# Patient Record
Sex: MALE | Race: WHITE | NOT HISPANIC OR LATINO | Employment: FULL TIME | URBAN - METROPOLITAN AREA
[De-identification: names, ages, dates, MRNs, and addresses within clinical notes are randomized per-mention and may not be internally consistent; named-entity substitution may affect disease eponyms.]

---

## 2017-01-24 ENCOUNTER — CLINICAL SUPPORT (OUTPATIENT)
Dept: FAMILY MEDICINE CLINIC | Facility: CLINIC | Age: 52
End: 2017-01-24

## 2017-01-24 DIAGNOSIS — Z23 NEED FOR VACCINATION FOR DTP: Primary | ICD-10-CM

## 2017-01-24 PROCEDURE — 90471 IMMUNIZATION ADMIN: CPT | Performed by: INTERNAL MEDICINE

## 2017-01-24 PROCEDURE — 90715 TDAP VACCINE 7 YRS/> IM: CPT | Performed by: INTERNAL MEDICINE

## 2017-01-24 NOTE — MR AVS SNAPSHOT
Luke Li   2017 8:00 AM   Appointment    Dept Phone:  [phone]   Encounter #:  [id number]    Provider:  NURSE/GARRY CORONADO [employer]   Department:  [facility] PRIMARY CARE                Your Full Care Plan              Your Updated Medication List          This list is accurate as of: 17  8:04 AM.  Always use your most recent med list.                azithromycin 250 MG tablet   Commonly known as:  ZITHROMAX Z-REGIS   Take 2 tablets the first day, then 1 tablet daily for 4 days.       HYOSCYAMINE SULFATE PO       labetalol 100 MG tablet   Commonly known as:  NORMODYNE   Take 1 tablet by mouth 2 (two) times a day.       losartan-hydrochlorothiazide 100-25 MG per tablet   Commonly known as:  HYZAAR   Take 1 tablet by mouth daily.       simvastatin 20 MG tablet   Commonly known as:  ZOCOR   Take 1 tablet by mouth every night.               Instructions     None    Patient Instructions History      Upcoming Appointments     Visit Type Date Time Department    NURSE/MA VISIT 2017  8:00 AM dermSearch Signup     [facility] [employer] allows you to send messages to your doctor, view your test results, renew your prescriptions, schedule appointments, and more. To sign up, go to [employer] and click on the Sign Up Now link in the New User? box. Enter your [employer] Activation Code exactly as it appears below along with the last four digits of your Social Security Number and your Date of Birth () to complete the sign-up process. If you do not sign up before the expiration date, you must request a new code.    [employer] Activation Code: [email or web address]  Expires: 2017  8:04 AM    If you have questions, you can email [email or web address] or call [phone] to talk to our [employer] staff. Remember, [employer] is NOT to be used for urgent needs. For medical emergencies, dial 911.               Other Info from Your Visit              Allergies     Penicillins        Vital Signs     Smoking Status                   Never Smoker

## 2017-02-01 RX ORDER — LABETALOL 100 MG/1
TABLET, FILM COATED ORAL
Qty: 180 TABLET | Refills: 0 | Status: SHIPPED | OUTPATIENT
Start: 2017-02-01 | End: 2017-05-05 | Stop reason: SDUPTHER

## 2017-02-01 RX ORDER — SIMVASTATIN 20 MG
TABLET ORAL
Qty: 90 TABLET | Refills: 0 | Status: SHIPPED | OUTPATIENT
Start: 2017-02-01 | End: 2017-05-05 | Stop reason: SDUPTHER

## 2017-02-01 RX ORDER — LOSARTAN POTASSIUM AND HYDROCHLOROTHIAZIDE 25; 100 MG/1; MG/1
TABLET ORAL
Qty: 90 TABLET | Refills: 0 | Status: SHIPPED | OUTPATIENT
Start: 2017-02-01 | End: 2017-05-05 | Stop reason: SDUPTHER

## 2017-05-05 RX ORDER — SIMVASTATIN 20 MG
TABLET ORAL
Qty: 30 TABLET | Refills: 0 | Status: SHIPPED | OUTPATIENT
Start: 2017-05-05 | End: 2017-06-05 | Stop reason: SDUPTHER

## 2017-05-05 RX ORDER — LABETALOL 100 MG/1
TABLET, FILM COATED ORAL
Qty: 60 TABLET | Refills: 0 | Status: SHIPPED | OUTPATIENT
Start: 2017-05-05 | End: 2017-06-05 | Stop reason: SDUPTHER

## 2017-05-05 RX ORDER — LOSARTAN POTASSIUM AND HYDROCHLOROTHIAZIDE 25; 100 MG/1; MG/1
TABLET ORAL
Qty: 30 TABLET | Refills: 0 | Status: SHIPPED | OUTPATIENT
Start: 2017-05-05 | End: 2017-06-05 | Stop reason: SDUPTHER

## 2017-06-05 RX ORDER — LABETALOL 100 MG/1
TABLET, FILM COATED ORAL
Qty: 60 TABLET | Refills: 0 | Status: SHIPPED | OUTPATIENT
Start: 2017-06-05 | End: 2017-07-06 | Stop reason: SDUPTHER

## 2017-06-05 RX ORDER — LOSARTAN POTASSIUM AND HYDROCHLOROTHIAZIDE 25; 100 MG/1; MG/1
TABLET ORAL
Qty: 30 TABLET | Refills: 0 | Status: SHIPPED | OUTPATIENT
Start: 2017-06-05 | End: 2017-07-06 | Stop reason: SDUPTHER

## 2017-06-05 RX ORDER — SIMVASTATIN 20 MG
TABLET ORAL
Qty: 30 TABLET | Refills: 0 | Status: SHIPPED | OUTPATIENT
Start: 2017-06-05 | End: 2017-11-20

## 2017-07-06 RX ORDER — SIMVASTATIN 20 MG
TABLET ORAL
Qty: 30 TABLET | Refills: 2 | Status: SHIPPED | OUTPATIENT
Start: 2017-07-06 | End: 2017-10-04 | Stop reason: SDUPTHER

## 2017-07-06 RX ORDER — LOSARTAN POTASSIUM AND HYDROCHLOROTHIAZIDE 25; 100 MG/1; MG/1
TABLET ORAL
Qty: 30 TABLET | Refills: 2 | Status: SHIPPED | OUTPATIENT
Start: 2017-07-06 | End: 2017-10-04 | Stop reason: SDUPTHER

## 2017-07-06 RX ORDER — LABETALOL 100 MG/1
TABLET, FILM COATED ORAL
Qty: 60 TABLET | Refills: 2 | Status: SHIPPED | OUTPATIENT
Start: 2017-07-06 | End: 2017-10-04 | Stop reason: SDUPTHER

## 2017-08-08 RX ORDER — HYOSCYAMINE SULFATE 0.125 MG
TABLET ORAL
Qty: 120 TABLET | Refills: 1 | Status: SHIPPED | OUTPATIENT
Start: 2017-08-08 | End: 2018-04-13 | Stop reason: ALTCHOICE

## 2017-10-04 RX ORDER — SIMVASTATIN 20 MG
TABLET ORAL
Qty: 30 TABLET | Refills: 1 | Status: SHIPPED | OUTPATIENT
Start: 2017-10-04 | End: 2017-12-06 | Stop reason: SDUPTHER

## 2017-10-04 RX ORDER — LOSARTAN POTASSIUM AND HYDROCHLOROTHIAZIDE 25; 100 MG/1; MG/1
TABLET ORAL
Qty: 30 TABLET | Refills: 1 | Status: SHIPPED | OUTPATIENT
Start: 2017-10-04 | End: 2017-12-06 | Stop reason: SDUPTHER

## 2017-10-04 RX ORDER — LABETALOL 100 MG/1
TABLET, FILM COATED ORAL
Qty: 60 TABLET | Refills: 1 | Status: SHIPPED | OUTPATIENT
Start: 2017-10-04 | End: 2017-12-06 | Stop reason: SDUPTHER

## 2017-11-20 ENCOUNTER — OFFICE VISIT (OUTPATIENT)
Dept: FAMILY MEDICINE CLINIC | Facility: CLINIC | Age: 52
End: 2017-11-20

## 2017-11-20 VITALS
BODY MASS INDEX: 39.82 KG/M2 | HEART RATE: 69 BPM | RESPIRATION RATE: 16 BRPM | HEIGHT: 72 IN | DIASTOLIC BLOOD PRESSURE: 80 MMHG | OXYGEN SATURATION: 96 % | SYSTOLIC BLOOD PRESSURE: 134 MMHG | WEIGHT: 294 LBS | TEMPERATURE: 97.8 F

## 2017-11-20 DIAGNOSIS — Z12.5 PROSTATE CANCER SCREENING: ICD-10-CM

## 2017-11-20 DIAGNOSIS — Z00.00 HEALTH CARE MAINTENANCE: Primary | ICD-10-CM

## 2017-11-20 DIAGNOSIS — I10 ESSENTIAL HYPERTENSION: ICD-10-CM

## 2017-11-20 DIAGNOSIS — E78.00 PURE HYPERCHOLESTEROLEMIA: ICD-10-CM

## 2017-11-20 DIAGNOSIS — Z12.11 COLON CANCER SCREENING: ICD-10-CM

## 2017-11-20 LAB
ALBUMIN SERPL-MCNC: 4.6 G/DL (ref 3.5–5.2)
ALBUMIN/GLOB SERPL: 1.6 G/DL
ALP SERPL-CCNC: 62 U/L (ref 39–117)
ALT SERPL-CCNC: 25 U/L (ref 1–41)
AST SERPL-CCNC: 21 U/L (ref 1–40)
BASOPHILS # BLD AUTO: 0.02 10*3/MM3 (ref 0–0.2)
BASOPHILS NFR BLD AUTO: 0.2 % (ref 0–1.5)
BILIRUB SERPL-MCNC: 0.4 MG/DL (ref 0.1–1.2)
BUN SERPL-MCNC: 15 MG/DL (ref 6–20)
BUN/CREAT SERPL: 15.6 (ref 7–25)
CALCIUM SERPL-MCNC: 9.5 MG/DL (ref 8.6–10.5)
CHLORIDE SERPL-SCNC: 102 MMOL/L (ref 98–107)
CHOLEST SERPL-MCNC: 167 MG/DL (ref 0–200)
CO2 SERPL-SCNC: 27.1 MMOL/L (ref 22–29)
CREAT SERPL-MCNC: 0.96 MG/DL (ref 0.76–1.27)
EOSINOPHIL # BLD AUTO: 0.3 10*3/MM3 (ref 0–0.7)
EOSINOPHIL NFR BLD AUTO: 3.3 % (ref 0.3–6.2)
ERYTHROCYTE [DISTWIDTH] IN BLOOD BY AUTOMATED COUNT: 13.3 % (ref 11.5–14.5)
GFR SERPLBLD CREATININE-BSD FMLA CKD-EPI: 100 ML/MIN/1.73
GFR SERPLBLD CREATININE-BSD FMLA CKD-EPI: 83 ML/MIN/1.73
GLOBULIN SER CALC-MCNC: 2.8 GM/DL
GLUCOSE SERPL-MCNC: 98 MG/DL (ref 65–99)
HCT VFR BLD AUTO: 42.8 % (ref 40.4–52.2)
HDLC SERPL-MCNC: 40 MG/DL (ref 40–60)
HGB BLD-MCNC: 14 G/DL (ref 13.7–17.6)
IMM GRANULOCYTES # BLD: 0.04 10*3/MM3 (ref 0–0.03)
IMM GRANULOCYTES NFR BLD: 0.4 % (ref 0–0.5)
LDLC SERPL CALC-MCNC: 98 MG/DL (ref 0–100)
LYMPHOCYTES # BLD AUTO: 2.05 10*3/MM3 (ref 0.9–4.8)
LYMPHOCYTES NFR BLD AUTO: 22.7 % (ref 19.6–45.3)
MCH RBC QN AUTO: 29.8 PG (ref 27–32.7)
MCHC RBC AUTO-ENTMCNC: 32.7 G/DL (ref 32.6–36.4)
MCV RBC AUTO: 91.1 FL (ref 79.8–96.2)
MONOCYTES # BLD AUTO: 0.43 10*3/MM3 (ref 0.2–1.2)
MONOCYTES NFR BLD AUTO: 4.8 % (ref 5–12)
NEUTROPHILS # BLD AUTO: 6.2 10*3/MM3 (ref 1.9–8.1)
NEUTROPHILS NFR BLD AUTO: 68.6 % (ref 42.7–76)
PLATELET # BLD AUTO: 201 10*3/MM3 (ref 140–500)
POTASSIUM SERPL-SCNC: 3.8 MMOL/L (ref 3.5–5.2)
PROT SERPL-MCNC: 7.4 G/DL (ref 6–8.5)
PSA SERPL-MCNC: 0.28 NG/ML (ref 0–4)
RBC # BLD AUTO: 4.7 10*6/MM3 (ref 4.6–6)
SODIUM SERPL-SCNC: 143 MMOL/L (ref 136–145)
T4 FREE SERPL-MCNC: 0.97 NG/DL (ref 0.93–1.7)
TRIGL SERPL-MCNC: 144 MG/DL (ref 0–150)
TSH SERPL DL<=0.005 MIU/L-ACNC: 1.23 MIU/ML (ref 0.27–4.2)
VLDLC SERPL CALC-MCNC: 28.8 MG/DL (ref 5–40)
WBC # BLD AUTO: 9.04 10*3/MM3 (ref 4.5–10.7)

## 2017-11-20 PROCEDURE — 99396 PREV VISIT EST AGE 40-64: CPT | Performed by: INTERNAL MEDICINE

## 2017-11-20 NOTE — PROGRESS NOTES
"Subjective   Patient ID: Luke Li is a 52 y.o. male presents with   Chief Complaint   Patient presents with   • Annual Exam       HPI - This patient is here today for yearly physical exam and treatment for hypertension and hyperlipidemia.  He's also ready to do his colonoscopy.  He wishes to see Dr. Yarbrough.  Overall patient feels well.    Assessment plan    Health care maintenance-screening colonoscopy-we'll get a PSA for prostate cancer screening.    Essential hypertension labetalol and Hyzaar    Hyperlipidemia simvastatin 20 get a fasting lipid profile-    Healthcare maintenance-refuses influenza vaccine.    Allergies   Allergen Reactions   • Penicillins        The following portions of the patient's history were reviewed and updated as appropriate: allergies, current medications, past family history, past medical history, past social history, past surgical history and problem list.      Review of Systems   Constitutional: Negative.    HENT: Negative.    Eyes: Negative.    Respiratory: Negative.    Cardiovascular: Negative.    Gastrointestinal: Negative.    Endocrine: Negative.    Genitourinary: Negative.    Musculoskeletal: Negative.    Skin: Negative.    Allergic/Immunologic: Negative.    Neurological: Negative.    Hematological: Negative.    Psychiatric/Behavioral: Negative.        Objective     Vitals:    11/20/17 0904   BP: 134/80   Pulse: 69   Resp: 16   Temp: 97.8 °F (36.6 °C)   TempSrc: Oral   SpO2: 96%   Weight: 294 lb (133 kg)   Height: 72\" (182.9 cm)         Physical Exam   Constitutional: He is oriented to person, place, and time. He appears well-developed and well-nourished. No distress.   HENT:   Head: Normocephalic and atraumatic.   Eyes: Conjunctivae and EOM are normal. Pupils are equal, round, and reactive to light. Right eye exhibits no discharge. Left eye exhibits no discharge. No scleral icterus.   Neck: Normal range of motion. Neck supple. No tracheal deviation present. No thyromegaly " present.   Cardiovascular: Normal rate, regular rhythm, normal heart sounds and normal pulses.  Exam reveals no gallop.    No murmur heard.  Pulmonary/Chest: Effort normal and breath sounds normal. No respiratory distress. He has no wheezes. He has no rales.   Musculoskeletal: Normal range of motion.   Neurological: He is alert and oriented to person, place, and time. He exhibits normal muscle tone. Coordination normal.   Skin: Skin is warm. No rash noted. No erythema. No pallor.   Psychiatric: He has a normal mood and affect. His behavior is normal. Judgment and thought content normal.   Nursing note and vitals reviewed.        Luke was seen today for annual exam.    Diagnoses and all orders for this visit:    Health care maintenance  -     Lipid Panel  -     CBC & Differential  -     Comprehensive Metabolic Panel  -     TSH+Free T4  -     PSA    Essential hypertension  -     Lipid Panel  -     CBC & Differential  -     Comprehensive Metabolic Panel  -     TSH+Free T4  -     PSA    Pure hypercholesterolemia  -     Lipid Panel  -     CBC & Differential  -     Comprehensive Metabolic Panel  -     TSH+Free T4  -     PSA    Prostate cancer screening  -     Lipid Panel  -     CBC & Differential  -     Comprehensive Metabolic Panel  -     TSH+Free T4  -     PSA    Colon cancer screening  -     Ambulatory Referral to General Surgery        Call or return to clinic prn if these symptoms worsen or fail to improve as anticipated.

## 2017-12-06 RX ORDER — LABETALOL 100 MG/1
TABLET, FILM COATED ORAL
Qty: 60 TABLET | Refills: 5 | Status: SHIPPED | OUTPATIENT
Start: 2017-12-06 | End: 2018-06-06 | Stop reason: SDUPTHER

## 2017-12-06 RX ORDER — SIMVASTATIN 20 MG
TABLET ORAL
Qty: 30 TABLET | Refills: 5 | Status: SHIPPED | OUTPATIENT
Start: 2017-12-06 | End: 2018-06-06 | Stop reason: SDUPTHER

## 2017-12-06 RX ORDER — LOSARTAN POTASSIUM AND HYDROCHLOROTHIAZIDE 25; 100 MG/1; MG/1
TABLET ORAL
Qty: 30 TABLET | Refills: 5 | Status: SHIPPED | OUTPATIENT
Start: 2017-12-06 | End: 2018-06-06 | Stop reason: SDUPTHER

## 2018-02-15 ENCOUNTER — TELEPHONE (OUTPATIENT)
Dept: FAMILY MEDICINE CLINIC | Facility: CLINIC | Age: 53
End: 2018-02-15

## 2018-02-15 NOTE — TELEPHONE ENCOUNTER
Received on call, patient with chest pain and increased heart rate after walking for extended period. Called patient, discussed need for ER due to correlation with chest pain with blood pressure elevated after exertion. Spouse says they do not want to go to the ER due to flu. Discussed the importance of going to ER now due to increased risk of a heart attack. Patient agreeable.

## 2018-02-23 ENCOUNTER — OFFICE VISIT (OUTPATIENT)
Dept: FAMILY MEDICINE CLINIC | Facility: CLINIC | Age: 53
End: 2018-02-23

## 2018-02-23 VITALS
WEIGHT: 289 LBS | HEART RATE: 64 BPM | HEIGHT: 72 IN | DIASTOLIC BLOOD PRESSURE: 82 MMHG | BODY MASS INDEX: 39.14 KG/M2 | TEMPERATURE: 98 F | OXYGEN SATURATION: 96 % | RESPIRATION RATE: 16 BRPM | SYSTOLIC BLOOD PRESSURE: 132 MMHG

## 2018-02-23 DIAGNOSIS — R07.9 CHEST PAIN, UNSPECIFIED TYPE: Primary | ICD-10-CM

## 2018-02-23 DIAGNOSIS — I10 ESSENTIAL HYPERTENSION: ICD-10-CM

## 2018-02-23 PROCEDURE — 99214 OFFICE O/P EST MOD 30 MIN: CPT | Performed by: INTERNAL MEDICINE

## 2018-02-23 RX ORDER — AMLODIPINE BESYLATE 5 MG/1
5 TABLET ORAL DAILY
Qty: 90 TABLET | Refills: 1 | Status: SHIPPED | OUTPATIENT
Start: 2018-02-23 | End: 2018-03-13 | Stop reason: SDUPTHER

## 2018-02-23 NOTE — PROGRESS NOTES
"Subjective   Patient ID: Luke Li is a 52 y.o. male presents with   Chief Complaint   Patient presents with   • Follow-up     [facility] gustafson for bp        HPI - This patient presents today for hypertension.  And atypical chest pain.  He went to the emergency room with some atypical chest pain and elevated blood pressure.  They did an electrocardiogram and did cardiac enzymes that were negative.  His symptoms are vague mild and nonexertional.  He does have a family history of coronary disease.  Today he is chest pain-free.  Blood pressure log indicates slightly elevated blood pressure.  I reviewed his testing from [facility]    Assessment plan    Atypical chest pain and uncontrolled essential hypertension continue current anti-hypertensives and add amlodipine 5 mg daily    Allergies   Allergen Reactions   • Penicillins        The following portions of the patient's history were reviewed and updated as appropriate: allergies, current medications, past family history, past medical history, past social history, past surgical history and problem list.      Review of Systems   Constitutional: Negative.    HENT: Negative.    Respiratory: Negative.  Negative for shortness of breath.    Cardiovascular: Positive for chest pain. Negative for leg swelling.   Musculoskeletal: Negative.    Psychiatric/Behavioral: Negative.        Objective     Vitals:    02/23/18 1002   BP: 132/82   Pulse: 64   Resp: 16   Temp: 98 °F (36.7 °C)   TempSrc: Oral   SpO2: 96%   Weight: 131 kg (289 lb)   Height: 182.9 cm (72\")         Physical Exam   Constitutional: He is oriented to person, place, and time. He appears well-developed and well-nourished.   HENT:   Head: Normocephalic and atraumatic.   Eyes: EOM are normal. Pupils are equal, round, and reactive to light.   Cardiovascular: Normal rate and regular rhythm.    Pulmonary/Chest: Effort normal and breath sounds normal.   Neurological: He is alert and oriented to person, place, and time. "   Psychiatric: He has a normal mood and affect. His behavior is normal.   Nursing note and vitals reviewed.        Luke was seen today for follow-up.    Diagnoses and all orders for this visit:    Chest pain, unspecified type    Essential hypertension  -     Ambulatory Referral to Cardiology    Other orders  -     amLODIPine (NORVASC) 5 MG tablet; Take 1 tablet by mouth Daily.        Call or return to clinic prn if these symptoms worsen or fail to improve as anticipated.

## 2018-03-12 ENCOUNTER — TELEPHONE (OUTPATIENT)
Dept: FAMILY MEDICINE CLINIC | Facility: CLINIC | Age: 53
End: 2018-03-12

## 2018-03-12 NOTE — TELEPHONE ENCOUNTER
Pt called about his bp average    2/23    3 pm 144/86      Hs   153/87  2/24     Am 152/89       Hs 152/83  2/25     Am 138/93       Hs 148/85  2/26    am 155/88         Hs 143/88  2/27    am 155/85       Hs    136/83  2/28 4 pm 151/87    Hs 164/93  3/1     am 144/95        Hs 155/92  3/2     am 166/102      Hs 133/112  3/3    am 129/76       Hs 138/90  3/4    am 155/89        hs 136/88  3/5    am 140/90        hs 131/95    3/10 am 152/92       Hs 156/88  3/11 am 128/90       hs 108/74  3/12 am 138/87        Taking losartan. labetaolol , amlodipine         Pt said this can wait for dr pablo to see what he wants to do with his bp meds. thanks

## 2018-03-13 RX ORDER — AMLODIPINE BESYLATE 5 MG/1
5 TABLET ORAL 2 TIMES DAILY
Qty: 180 TABLET | Refills: 0 | Status: SHIPPED | OUTPATIENT
Start: 2018-03-13 | End: 2018-07-16 | Stop reason: SDUPTHER

## 2018-03-13 NOTE — TELEPHONE ENCOUNTER
Blood pressures elevated  Increase amlodipine 5 mg twice daily  Follow-up with Dr. Randhawa in couple weeks    Sooner problems  Please give him new Rx amlodipine 5 mg  Dispense 60  +2 refills  One by mouth twice a day    Thank you

## 2018-03-22 ENCOUNTER — TELEPHONE (OUTPATIENT)
Dept: FAMILY MEDICINE CLINIC | Facility: CLINIC | Age: 53
End: 2018-03-22

## 2018-03-22 NOTE — TELEPHONE ENCOUNTER
We did increase meds last weeks and his numbers on bp are still not great here they are for the past week    3/17/18 am 137/91     pm ---  3/18/18 am 164/106   pm 133/81  3/19/18 am 163/93     Pm 149/97  3/20/18 am 159/94     Pm 145/93  3/21/18 am 157/97     Pm 149/91  3/22/18 am 139/92    Please advise thanks

## 2018-03-23 NOTE — TELEPHONE ENCOUNTER
Patient should be on Hyzaar and amlodipine 5 mg twice daily have him take all 10 mg of amlodipine before bed.  Patient needs to lose about 10 pounds and I believe that way up the blood pressure to

## 2018-04-13 ENCOUNTER — OFFICE VISIT (OUTPATIENT)
Dept: CARDIOLOGY | Facility: CLINIC | Age: 53
End: 2018-04-13

## 2018-04-13 VITALS
BODY MASS INDEX: 39.14 KG/M2 | DIASTOLIC BLOOD PRESSURE: 94 MMHG | HEART RATE: 60 BPM | SYSTOLIC BLOOD PRESSURE: 154 MMHG | HEIGHT: 72 IN | WEIGHT: 289 LBS

## 2018-04-13 DIAGNOSIS — I10 ESSENTIAL HYPERTENSION: Primary | ICD-10-CM

## 2018-04-13 PROCEDURE — 93000 ELECTROCARDIOGRAM COMPLETE: CPT | Performed by: INTERNAL MEDICINE

## 2018-04-13 PROCEDURE — 99203 OFFICE O/P NEW LOW 30 MIN: CPT | Performed by: INTERNAL MEDICINE

## 2018-04-13 RX ORDER — CETIRIZINE HYDROCHLORIDE 10 MG/1
10 TABLET ORAL DAILY
COMMUNITY

## 2018-04-13 NOTE — PROGRESS NOTES
Subjective:     Encounter Date:04/13/2018      Patient ID: Luke Li is a 52 y.o. male.    Chief Complaint:  Hypertension   This is a new problem. The current episode started 1 to 4 weeks ago. The problem has been waxing and waning since onset. The problem is resistant. Pertinent negatives include no chest pain, peripheral edema or shortness of breath.       52-year-old gentleman who was referred by Dr. Randhawa for consultation of hypertension.  Patient presented with some chest discomforts.  He described it as a pressure sensation.  During the time of pressure in his chest his blood pressure was 193/101.  He was initiated on antihypertensive therapy.  Ice see his wife is a patient and he subsequently came in to see me today for further evaluation.  Patient currently denies intensive cardiac symptoms no chest pain shortness of breath dizziness edema palpitations or fatigue.  All in all he is felt better since initiation of his blood pressure medicine however his blood pressure continues to fluctuate quite a bit.  He brought in a fairly extensive log of his blood pressure.    Review of Systems   Cardiovascular: Negative for chest pain.   Respiratory: Negative for shortness of breath.    Musculoskeletal: Positive for joint pain.         ECG 12 Lead  Date/Time: 4/13/2018 4:00 PM  Performed by: SHIRA OLIVARES  Authorized by: SHIRA OLIVARES   Comparison: compared with previous ECG from 2/14/2018  Similar to previous ECG  Rhythm: sinus rhythm  Clinical impression: normal ECG               Objective:     Physical Exam   Constitutional: He is oriented to person, place, and time. He appears well-developed.   HENT:   Head: Normocephalic.   Eyes: Conjunctivae are normal.   Neck: Normal range of motion.   Cardiovascular: Normal rate, regular rhythm and normal heart sounds.    Pulmonary/Chest: Breath sounds normal.   Abdominal: Soft. Bowel sounds are normal.   Musculoskeletal: Normal range of motion. He exhibits  no edema.   Neurological: He is alert and oriented to person, place, and time.   Skin: Skin is warm and dry.   Psychiatric: He has a normal mood and affect. His behavior is normal.   Vitals reviewed.      Lab Review:       Assessment:          Diagnosis Plan   1. Essential hypertension            Plan:       1.  Hypertension.  With a marked elevated blood pressure he had some pressure in his chest which is not unusual.  Moreover his blood pressure has been fluctuating enormous amount.  After extensive discussion with the patient he did started drinking some V8.  He gave up soft drinks and switch to a different type of fluid which turned out to be V8.  I believe this is why his blood pressures fluctuating so much he is also going to try to educate himself more on the salt content of various foods.  The time being and see what evolves and reassess him in 6 weeks.

## 2018-05-31 ENCOUNTER — TELEPHONE (OUTPATIENT)
Dept: FAMILY MEDICINE CLINIC | Facility: CLINIC | Age: 53
End: 2018-05-31

## 2018-05-31 DIAGNOSIS — R73.9 HYPERGLYCEMIA: Primary | ICD-10-CM

## 2018-05-31 DIAGNOSIS — E78.5 HYPERLIPIDEMIA, UNSPECIFIED HYPERLIPIDEMIA TYPE: ICD-10-CM

## 2018-05-31 DIAGNOSIS — Z00.00 HEALTH CARE MAINTENANCE: ICD-10-CM

## 2018-05-31 NOTE — TELEPHONE ENCOUNTER
Wife called  For early wing for her  stating he has high BP readings are  5/31  156/97  5/30 156/96  5/29 150/39  5/28 147/82  5/27 155/84  5/26 156/90  5/25 154/90  He take   amLODIPine (NORVASC) 5 MG tablet     losartan-hydrochlorothiazide (HYZAAR) 100-25 MG       labetalol (NORMODYNE) 100 MG tablet    they do not want to be seen by [facility] practitioner ,they scheduling for sep 29  to be seen

## 2018-05-31 NOTE — TELEPHONE ENCOUNTER
Patient with chronic HTN needs to transfer care.  Can see NP to bridge them til I can see him.    Risks/benefits discussed with patient or patient surrogate

## 2018-06-06 RX ORDER — LABETALOL 100 MG/1
TABLET, FILM COATED ORAL
Qty: 60 TABLET | Refills: 4 | Status: SHIPPED | OUTPATIENT
Start: 2018-06-06

## 2018-06-06 RX ORDER — SIMVASTATIN 20 MG
TABLET ORAL
Qty: 30 TABLET | Refills: 4 | Status: SHIPPED | OUTPATIENT
Start: 2018-06-06

## 2018-06-06 RX ORDER — LOSARTAN POTASSIUM AND HYDROCHLOROTHIAZIDE 25; 100 MG/1; MG/1
TABLET ORAL
Qty: 30 TABLET | Refills: 4 | Status: SHIPPED | OUTPATIENT
Start: 2018-06-06

## 2018-06-12 ENCOUNTER — OFFICE VISIT (OUTPATIENT)
Dept: CARDIOLOGY | Facility: CLINIC | Age: 53
End: 2018-06-12

## 2018-06-12 VITALS
DIASTOLIC BLOOD PRESSURE: 80 MMHG | HEIGHT: 72 IN | WEIGHT: 289 LBS | SYSTOLIC BLOOD PRESSURE: 140 MMHG | HEART RATE: 65 BPM | BODY MASS INDEX: 39.14 KG/M2 | OXYGEN SATURATION: 99 %

## 2018-06-12 DIAGNOSIS — I10 ESSENTIAL HYPERTENSION: Primary | ICD-10-CM

## 2018-06-12 PROCEDURE — 99213 OFFICE O/P EST LOW 20 MIN: CPT | Performed by: INTERNAL MEDICINE

## 2018-06-12 NOTE — PROGRESS NOTES
Subjective:     Encounter Date:04/13/2018      Patient ID: Luke Li is a 52 y.o. male.    Chief Complaint:  Hypertension   This is a chronic problem. The current episode started more than 1 year ago. The problem is resistant. Pertinent negatives include no anxiety, chest pain, malaise/fatigue, palpitations, peripheral edema or shortness of breath.       52-year-old gentleman who Presents today for follow-up.  His blood pressure continues to fluctuate.  He did say that when he eats out he wasn't foods are very salty.  Blood pressure today was 140/80 and overall his blood pressures been running about this range.  His Norvasc was decreased from 10 mg down to 5 mg of though he doesn't know why.  He denies a types of significant lower extremity edema no cough no chest pains.    Review of Systems   Constitution: Negative for malaise/fatigue.   Cardiovascular: Negative for chest pain and palpitations.   Respiratory: Positive for snoring. Negative for shortness of breath.    Musculoskeletal: Positive for joint pain.   All other systems reviewed and are negative.      Procedures         Objective:     Physical Exam   Constitutional: He is oriented to person, place, and time. He appears well-developed.   HENT:   Head: Normocephalic.   Eyes: Conjunctivae are normal.   Neck: Normal range of motion.   Cardiovascular: Normal rate, regular rhythm and normal heart sounds.    Pulmonary/Chest: Breath sounds normal.   Abdominal: Soft. Bowel sounds are normal.   Musculoskeletal: Normal range of motion. He exhibits no edema.   Neurological: He is alert and oriented to person, place, and time.   Skin: Skin is warm and dry.   Psychiatric: He has a normal mood and affect. His behavior is normal.   Vitals reviewed.      Lab Review:       Assessment:          Diagnosis Plan   1. Essential hypertension            Plan:       1.  Hypertension.  Still not optimally controlled but better.  At this point I'm going to increase his  Norvasc to 10 mg a day follow back up with him in 3 months for reassessment.  Overall I do think his blood pressure is stabilizing significantly.

## 2018-07-05 ENCOUNTER — RESULTS ENCOUNTER (OUTPATIENT)
Dept: FAMILY MEDICINE CLINIC | Facility: CLINIC | Age: 53
End: 2018-07-05

## 2018-07-05 DIAGNOSIS — Z00.00 HEALTH CARE MAINTENANCE: ICD-10-CM

## 2018-07-05 DIAGNOSIS — E78.5 HYPERLIPIDEMIA, UNSPECIFIED HYPERLIPIDEMIA TYPE: ICD-10-CM

## 2018-07-05 DIAGNOSIS — R73.9 HYPERGLYCEMIA: ICD-10-CM

## 2018-07-16 RX ORDER — AMLODIPINE BESYLATE 5 MG/1
TABLET ORAL
Qty: 60 TABLET | Refills: 0 | Status: SHIPPED | OUTPATIENT
Start: 2018-07-16 | End: 2018-08-16 | Stop reason: SDUPTHER

## 2018-07-25 ENCOUNTER — OFFICE VISIT (OUTPATIENT)
Dept: FAMILY MEDICINE CLINIC | Facility: CLINIC | Age: 53
End: 2018-07-25

## 2018-07-25 VITALS
HEIGHT: 72 IN | TEMPERATURE: 98.2 F | SYSTOLIC BLOOD PRESSURE: 136 MMHG | WEIGHT: 296 LBS | DIASTOLIC BLOOD PRESSURE: 88 MMHG | BODY MASS INDEX: 40.09 KG/M2 | RESPIRATION RATE: 16 BRPM | OXYGEN SATURATION: 98 % | HEART RATE: 82 BPM

## 2018-07-25 DIAGNOSIS — B30.9 VIRAL CONJUNCTIVITIS: ICD-10-CM

## 2018-07-25 DIAGNOSIS — E66.01 OBESITY, MORBID, BMI 40.0-49.9 (HCC): ICD-10-CM

## 2018-07-25 DIAGNOSIS — I10 ESSENTIAL HYPERTENSION: Primary | ICD-10-CM

## 2018-07-25 DIAGNOSIS — E78.00 PURE HYPERCHOLESTEROLEMIA: ICD-10-CM

## 2018-07-25 PROBLEM — N20.1 CALCULUS OF URETER: Status: ACTIVE | Noted: 2018-07-25

## 2018-07-25 PROCEDURE — 99213 OFFICE O/P EST LOW 20 MIN: CPT | Performed by: FAMILY MEDICINE

## 2018-07-25 RX ORDER — CLONIDINE HYDROCHLORIDE 0.1 MG/1
TABLET ORAL
Qty: 60 TABLET | Refills: 0 | Status: SHIPPED | OUTPATIENT
Start: 2018-07-25 | End: 2018-08-23 | Stop reason: SDUPTHER

## 2018-07-25 RX ORDER — ASPIRIN 81 MG/1
TABLET ORAL
Start: 2018-07-25 | End: 2018-09-19 | Stop reason: ALTCHOICE

## 2018-07-25 NOTE — PROGRESS NOTES
Subjective   Luke Li is a 52 y.o. male.     New patient to me follow-up of hypertension, hyperlipidemia and obesity.  His only acute complaint today is a few days of left eye irritation with redness and tearing and some itching.  He is not sick.  The tearing has begun to decrease.  Had no difficulty with his medications for hyperlipidemia or hypertension but brings in a diary that shows that he essentially never is at target.  Chest pain or dyspnea.  Does physical work but really doesn't get any significant exercise.  Has been delaying getting his colonoscopy.  No symptoms relative to his colon.  He has had anal fissure repair in the past.         The following portions of the patient's history were reviewed and updated as appropriate: allergies, current medications, past family history, past medical history, past social history, past surgical history and problem list.    Review of Systems   Constitutional: Negative.  Negative for chills and fever.   HENT: Negative.  Negative for congestion, rhinorrhea and sore throat.    Eyes: Positive for discharge, redness and itching. Negative for visual disturbance.   Respiratory: Negative.  Negative for cough and shortness of breath.    Cardiovascular: Negative.  Negative for chest pain.   Gastrointestinal: Negative.  Negative for abdominal pain, constipation, diarrhea, nausea and vomiting.   Endocrine: Negative.  Negative for polydipsia.   Genitourinary: Negative.  Negative for dysuria and hematuria.   Musculoskeletal: Negative.  Negative for arthralgias and myalgias.   Skin: Negative.  Negative for rash.   Allergic/Immunologic: Negative.    Neurological: Negative.  Negative for weakness, numbness and headaches.   Hematological: Negative.  Does not bruise/bleed easily.   Psychiatric/Behavioral: Negative.  Negative for dysphoric mood. The patient is not nervous/anxious.    All other systems reviewed and are negative.      Objective   Physical Exam   Constitutional: He  is oriented to person, place, and time. He appears well-developed and well-nourished.   HENT:   Head: Normocephalic and atraumatic.   Right Ear: External ear normal.   Left Ear: External ear normal.   Mouth/Throat: No oropharyngeal exudate.   Eyes: Pupils are equal, round, and reactive to light. EOM and lids are normal. Right eye exhibits no discharge. Left eye exhibits chemosis. Left eye exhibits no discharge and no exudate. Left conjunctiva is injected. No scleral icterus.   No preauricular adenopathy.   Neck: Trachea normal, normal range of motion and phonation normal. Neck supple. No thyromegaly present.   Cardiovascular: Normal rate, regular rhythm and normal heart sounds.  Exam reveals no gallop and no friction rub.    No murmur heard.  Pulmonary/Chest: Effort normal and breath sounds normal. No stridor. He has no wheezes. He has no rales.   Abdominal: Soft. He exhibits no distension. There is no tenderness.   Musculoskeletal: Normal range of motion. He exhibits no edema.   Lymphadenopathy:     He has no cervical adenopathy.   Neurological: He is alert and oriented to person, place, and time. He has normal strength. No cranial nerve deficit.   Skin: Skin is warm, dry and intact. No petechiae and no rash noted. No cyanosis. Nails show no clubbing.   Psychiatric: He has a normal mood and affect. His speech is normal and behavior is normal. Judgment and thought content normal. Cognition and memory are normal.   Nursing note and vitals reviewed.    He did have a honey bun about 4 hours ago but will go ahead and do his labs today since he's missed out on having them done.  Clearly not at target with blood pressure even though today is about the best reading he's had a long time.  We will add clonidine.  Importance of both aerobic and resistance training as well as carb restriction was discussed.  Recommended patient read YOUNGER NEXT YEAR  Urged him to follow through on his colonoscopy.    Assessment/Plan   Luke  was seen today for establish care and hypertension.    Diagnoses and all orders for this visit:    Essential hypertension    Pure hypercholesterolemia    Viral conjunctivitis    Obesity, morbid, BMI 40.0-49.9 (CMS/formerly [facility])    Other orders  -     aspirin 81 MG EC tablet; 1 OTC tab po QDay for cardiovascular risk reduction  -     CloNIDine (CATAPRES) 0.1 MG tablet; One tab po BID for blood pressure      Patient Instructions   Start one low-dose aspirin for cardiovascular protection    .Talk with your pharmacist about both the new shingles vaccine (Shingrix) and the Hepatitis A vaccine.  Both are two injections, six months apart.  I highly recommend this.    Call us with the date you get these so we can put in your records    Read YOUNGER NEXT YEAR or YOUNGER NEXT YEAR FOR WOMEN  I encourage 40 minutes of aerobic/cardio exercise 4 times weekly with 20 minute of resistance/strength training 4 times weekly --so one hour four days weekly to live longer healthier life.     Try OTC Naphcon-A (use store brand) for eye.    If drainage becomes colored, call and I will send antibiotic drop    IT IS VERY IMPORTANT THAT YOU KEEP A PRINTED LIST OF ALL OF YOUR MEDICATIONS AND DOSES AND OF ALL MEDICATION ALLERGIES WITH YOU/ON YOUR PERSON AT ALL TIMES.  THIS IS OF CRITICAL IMPORTANCE IN THE EVENT THAT YOU ARE INJURED OR ILL AND ARE UNABLE TO CONVEY THIS INFORMATION TO THOSE CARING FOR YOU IN AN EMERGENCY SITUATION.      Make an appointment for six months with your new primary physician; if outside of [facility] system let us know so we can transfer records.     Start new blood pressure med and watch BP twice daily. Let me know how it is doing.       EMR Dragon/Transcription disclaimer:   Much of this encounter note is an electronic transcription/translation of spoken language to printed text. The electronic translation of spoken language may permit erroneous, or at times, nonsensical words or phrases to be inadvertently transcribed;  Although I have reviewed the note for such errors, some may still exist. Please contact me with any questions or concerns about the conduct of this encounter note.

## 2018-07-25 NOTE — PATIENT INSTRUCTIONS
Start one low-dose aspirin for cardiovascular protection    .Talk with your pharmacist about both the new shingles vaccine (Shingrix) and the Hepatitis A vaccine.  Both are two injections, six months apart.  I highly recommend this.    Call us with the date you get these so we can put in your records    Read YOUNGER NEXT YEAR or YOUNGER NEXT YEAR FOR WOMEN  I encourage 40 minutes of aerobic/cardio exercise 4 times weekly with 20 minute of resistance/strength training 4 times weekly --so one hour four days weekly to live longer healthier life.     Try OTC Naphcon-A (use store brand) for eye.    If drainage becomes colored, call and I will send antibiotic drop    IT IS VERY IMPORTANT THAT YOU KEEP A PRINTED LIST OF ALL OF YOUR MEDICATIONS AND DOSES AND OF ALL MEDICATION ALLERGIES WITH YOU/ON YOUR PERSON AT ALL TIMES.  THIS IS OF CRITICAL IMPORTANCE IN THE EVENT THAT YOU ARE INJURED OR ILL AND ARE UNABLE TO CONVEY THIS INFORMATION TO THOSE CARING FOR YOU IN AN EMERGENCY SITUATION.      Make an appointment for six months with your new primary physician; if outside of [facility] system let us know so we can transfer records.     Start new blood pressure med and watch BP twice daily. Let me know how it is doing.

## 2018-07-26 PROBLEM — E55.9 VITAMIN D DEFICIENCY: Status: ACTIVE | Noted: 2018-07-26

## 2018-07-26 LAB
25(OH)D3+25(OH)D2 SERPL-MCNC: 18.8 NG/ML (ref 30–100)
ALBUMIN SERPL-MCNC: 5 G/DL (ref 3.5–5.2)
ALBUMIN/GLOB SERPL: 2.2 G/DL
ALP SERPL-CCNC: 61 U/L (ref 39–117)
ALT SERPL-CCNC: 25 U/L (ref 1–41)
AST SERPL-CCNC: 17 U/L (ref 1–40)
BILIRUB SERPL-MCNC: 0.4 MG/DL (ref 0.1–1.2)
BUN SERPL-MCNC: 16 MG/DL (ref 6–20)
BUN/CREAT SERPL: 13.9 (ref 7–25)
CALCIUM SERPL-MCNC: 9.4 MG/DL (ref 8.6–10.5)
CHLORIDE SERPL-SCNC: 102 MMOL/L (ref 98–107)
CHOLEST SERPL-MCNC: 158 MG/DL (ref 0–200)
CO2 SERPL-SCNC: 26.6 MMOL/L (ref 22–29)
CREAT SERPL-MCNC: 1.15 MG/DL (ref 0.76–1.27)
GLOBULIN SER CALC-MCNC: 2.3 GM/DL
GLUCOSE SERPL-MCNC: 93 MG/DL (ref 65–99)
HBA1C MFR BLD: 5.5 % (ref 4.8–5.6)
HDLC SERPL-MCNC: 41 MG/DL (ref 40–60)
LDLC SERPL CALC-MCNC: 91 MG/DL (ref 0–100)
POTASSIUM SERPL-SCNC: 3.6 MMOL/L (ref 3.5–5.2)
PROT SERPL-MCNC: 7.3 G/DL (ref 6–8.5)
SODIUM SERPL-SCNC: 143 MMOL/L (ref 136–145)
TRIGL SERPL-MCNC: 130 MG/DL (ref 0–150)
VLDLC SERPL CALC-MCNC: 26 MG/DL (ref 5–40)

## 2018-08-16 RX ORDER — AMLODIPINE BESYLATE 5 MG/1
TABLET ORAL
Qty: 60 TABLET | Refills: 3 | Status: SHIPPED | OUTPATIENT
Start: 2018-08-16

## 2018-08-23 RX ORDER — CLONIDINE HYDROCHLORIDE 0.1 MG/1
TABLET ORAL
Qty: 60 TABLET | Refills: 0 | Status: SHIPPED | OUTPATIENT
Start: 2018-08-23 | End: 2018-09-16 | Stop reason: SDUPTHER

## 2018-09-17 RX ORDER — CLONIDINE HYDROCHLORIDE 0.1 MG/1
TABLET ORAL
Qty: 60 TABLET | Refills: 0 | Status: SHIPPED | OUTPATIENT
Start: 2018-09-17

## 2018-09-19 ENCOUNTER — OFFICE VISIT (OUTPATIENT)
Dept: CARDIOLOGY | Facility: CLINIC | Age: 53
End: 2018-09-19

## 2018-09-19 VITALS
BODY MASS INDEX: 39.9 KG/M2 | HEART RATE: 78 BPM | DIASTOLIC BLOOD PRESSURE: 90 MMHG | SYSTOLIC BLOOD PRESSURE: 150 MMHG | OXYGEN SATURATION: 99 % | WEIGHT: 294.6 LBS | HEIGHT: 72 IN

## 2018-09-19 DIAGNOSIS — I10 ESSENTIAL HYPERTENSION: Primary | ICD-10-CM

## 2018-09-19 PROCEDURE — 99213 OFFICE O/P EST LOW 20 MIN: CPT | Performed by: INTERNAL MEDICINE

## 2018-09-20 NOTE — PROGRESS NOTES
Subjective:     Encounter Date:04/13/2018      Patient ID: Luke Li is a 53 y.o. male.    Chief Complaint:  Hypertension   This is a chronic problem. The current episode started more than 1 year ago. The problem is resistant. Pertinent negatives include no anxiety, chest pain, malaise/fatigue, palpitations, peripheral edema or shortness of breath.       52-year-old gentleman who presents for reevaluation.  Patient has been doing relatively well on his salt was A surprise to see his blood pressure was as high as it was today.  His been running pretty consistently about 130/70 at home.  Patient is no complaints of palpitations shortness of breath edema lightheadedness chest pain or fatigue.    Review of Systems   Constitution: Negative for malaise/fatigue.   Cardiovascular: Negative for chest pain and palpitations.   Respiratory: Positive for snoring. Negative for shortness of breath.    Musculoskeletal: Positive for joint pain.   All other systems reviewed and are negative.      Procedures         Objective:     Physical Exam   Constitutional: He is oriented to person, place, and time. He appears well-developed.   HENT:   Head: Normocephalic.   Eyes: Conjunctivae are normal.   Neck: Normal range of motion.   Cardiovascular: Normal rate, regular rhythm and normal heart sounds.    Pulmonary/Chest: Breath sounds normal.   Abdominal: Soft. Bowel sounds are normal.   Musculoskeletal: Normal range of motion. He exhibits no edema.   Neurological: He is alert and oriented to person, place, and time.   Skin: Skin is warm and dry.   Psychiatric: He has a normal mood and affect. His behavior is normal.   Vitals reviewed.      Lab Review:       Assessment:          Diagnosis Plan   1. Essential hypertension            Plan:       1.  Hypertension.  Overall better controlled.  He is going to continue to try to lose some weight as well as do some types of exercise.  We'll continue follows blood pressure closely see him  back in 6 months sooner if his blood pressure goes up.

## 2018-10-22 RX ORDER — CLONIDINE HYDROCHLORIDE 0.1 MG/1
TABLET ORAL
Qty: 60 TABLET | Refills: 0 | OUTPATIENT
Start: 2018-10-22

## 2018-10-25 RX ORDER — CLONIDINE HYDROCHLORIDE 0.1 MG/1
TABLET ORAL
Qty: 60 TABLET | Refills: 0 | OUTPATIENT
Start: 2018-10-25

## 2018-11-05 RX ORDER — LABETALOL 100 MG/1
TABLET, FILM COATED ORAL
Qty: 60 TABLET | Refills: 3 | OUTPATIENT
Start: 2018-11-05

## 2018-11-05 RX ORDER — LOSARTAN POTASSIUM AND HYDROCHLOROTHIAZIDE 25; 100 MG/1; MG/1
TABLET ORAL
Qty: 30 TABLET | Refills: 3 | OUTPATIENT
Start: 2018-11-05

## 2018-11-05 RX ORDER — SIMVASTATIN 20 MG
TABLET ORAL
Qty: 30 TABLET | Refills: 3 | OUTPATIENT
Start: 2018-11-05

## 2018-12-17 RX ORDER — AMLODIPINE BESYLATE 5 MG/1
TABLET ORAL
Qty: 60 TABLET | Refills: 2 | OUTPATIENT
Start: 2018-12-17

## 2024-10-30 ENCOUNTER — LAB REQUISITION (OUTPATIENT)
Dept: LAB | Facility: HOSPITAL | Age: 59
End: 2024-10-30
Payer: COMMERCIAL

## 2024-10-30 DIAGNOSIS — N20.0 CALCULUS OF KIDNEY: ICD-10-CM

## 2024-10-30 PROCEDURE — 82365 CALCULUS SPECTROSCOPY: CPT | Performed by: UROLOGY

## 2024-11-05 LAB
COLOR STONE: NORMAL
COMPN STONE: NORMAL
LABORATORY COMMENT REPORT: NORMAL
Lab: NORMAL
Lab: NORMAL
PHOTO: NORMAL
SIZE STONE: NORMAL MM
SPEC SOURCE SUBJ: NORMAL
URATE MFR STONE: 100 %
WT STONE: 85 MG